# Patient Record
Sex: MALE | Race: WHITE | NOT HISPANIC OR LATINO | RURAL
[De-identification: names, ages, dates, MRNs, and addresses within clinical notes are randomized per-mention and may not be internally consistent; named-entity substitution may affect disease eponyms.]

---

## 2019-05-13 ENCOUNTER — OFFICE VISIT (OUTPATIENT)
Dept: RETAIL CLINIC | Facility: CLINIC | Age: 24
End: 2019-05-13

## 2019-05-13 VITALS
HEIGHT: 68 IN | RESPIRATION RATE: 12 BRPM | BODY MASS INDEX: 22.1 KG/M2 | OXYGEN SATURATION: 99 % | TEMPERATURE: 97.7 F | WEIGHT: 145.8 LBS

## 2019-05-13 DIAGNOSIS — L60.0 INGROWN LEFT GREATER TOENAIL: Primary | ICD-10-CM

## 2019-05-13 PROCEDURE — 99203 OFFICE O/P NEW LOW 30 MIN: CPT | Performed by: NURSE PRACTITIONER

## 2019-05-13 RX ORDER — AMOXICILLIN AND CLAVULANATE POTASSIUM 875; 125 MG/1; MG/1
1 TABLET, FILM COATED ORAL 2 TIMES DAILY
Qty: 20 TABLET | Refills: 0 | Status: SHIPPED | OUTPATIENT
Start: 2019-05-13

## 2019-05-13 NOTE — PROGRESS NOTES
"Subjective   Elijah Rocha is a 23 y.o. male.     Toe Pain    The incident occurred more than 1 week ago. The incident occurred at home. There was no injury mechanism. Pain location: left great toe. The quality of the pain is described as aching and stabbing. The pain is severe. The pain has been fluctuating since onset. Pertinent negatives include no inability to bear weight, loss of sensation, muscle weakness, numbness or tingling. He reports no foreign bodies present. The symptoms are aggravated by palpation and weight bearing. Treatments tried: soaking. The treatment provided mild relief.        The following portions of the patient's history were reviewed and updated as appropriate: allergies, current medications, past medical history, past social history, past surgical history and problem list.    Review of Systems   Constitutional: Negative.  Negative for fatigue and fever.   Respiratory: Negative.  Negative for cough and shortness of breath.    Cardiovascular: Negative.    Gastrointestinal: Negative.    Skin:        Redness, swelling and pain around left great toenail, worsening, unresponsive to otc treatment   Neurological: Negative for tingling and numbness.   Hematological: Negative.  Negative for adenopathy.        Temp 97.7 °F (36.5 °C)   Resp 12   Ht 172.7 cm (68\")   Wt 66.1 kg (145 lb 12.8 oz)   SpO2 99%   BMI 22.17 kg/m²      Objective   Physical Exam   Constitutional: He is oriented to person, place, and time. He appears well-developed and well-nourished. No distress.   Neurological: He is alert and oriented to person, place, and time.   Skin: Skin is warm and dry. There is erythema (swelling, pain on palpation of skin around left great toenail, no exudate or streaking.).   Psychiatric: He has a normal mood and affect. His behavior is normal. Thought content normal.   Vitals reviewed.      Assessment/Plan   Elijah was seen today for toe pain.    Diagnoses and all orders for this " visit:    Ingrown left greater toenail  -     amoxicillin-clavulanate (AUGMENTIN) 875-125 MG per tablet; Take 1 tablet by mouth 2 (Two) Times a Day.